# Patient Record
Sex: FEMALE | Race: OTHER
[De-identification: names, ages, dates, MRNs, and addresses within clinical notes are randomized per-mention and may not be internally consistent; named-entity substitution may affect disease eponyms.]

---

## 2017-06-11 ENCOUNTER — HOSPITAL ENCOUNTER (EMERGENCY)
Dept: HOSPITAL 54 - ER | Age: 52
Discharge: HOME | End: 2017-06-11
Payer: MEDICAID

## 2017-06-11 VITALS — SYSTOLIC BLOOD PRESSURE: 138 MMHG | DIASTOLIC BLOOD PRESSURE: 75 MMHG

## 2017-06-11 VITALS — HEIGHT: 64 IN | BODY MASS INDEX: 30.73 KG/M2 | WEIGHT: 180 LBS

## 2017-06-11 DIAGNOSIS — R10.32: Primary | ICD-10-CM

## 2017-06-11 DIAGNOSIS — F41.9: ICD-10-CM

## 2017-06-11 DIAGNOSIS — Z98.890: ICD-10-CM

## 2017-06-11 DIAGNOSIS — Z88.2: ICD-10-CM

## 2017-06-11 LAB
ALBUMIN SERPL BCP-MCNC: 3.5 G/DL (ref 3.4–5)
ALP SERPL-CCNC: 65 U/L (ref 46–116)
ALT SERPL W P-5'-P-CCNC: 21 U/L (ref 12–78)
AST SERPL W P-5'-P-CCNC: 16 U/L (ref 15–37)
BASOPHILS # BLD AUTO: 0.1 /CMM (ref 0–0.2)
BASOPHILS NFR BLD AUTO: 1.3 % (ref 0–2)
BILIRUB DIRECT SERPL-MCNC: 0.1 MG/DL (ref 0–0.2)
BILIRUB SERPL-MCNC: 0.5 MG/DL (ref 0.2–1)
BUN SERPL-MCNC: 15 MG/DL (ref 7–18)
CALCIUM SERPL-MCNC: 9.3 MG/DL (ref 8.5–10.1)
CHLORIDE SERPL-SCNC: 105 MMOL/L (ref 98–107)
CO2 SERPL-SCNC: 26 MMOL/L (ref 21–32)
CREAT SERPL-MCNC: 1 MG/DL (ref 0.6–1.3)
EOSINOPHIL # BLD AUTO: 0.1 /CMM (ref 0–0.7)
EOSINOPHIL NFR BLD AUTO: 1.5 % (ref 0–6)
EOSINOPHIL NFR BLD MANUAL: 2 % (ref 0–4)
GLUCOSE SERPL-MCNC: 106 MG/DL (ref 74–106)
HCT VFR BLD AUTO: 38 % (ref 33–45)
HGB BLD-MCNC: 12.4 G/DL (ref 11.5–14.8)
LYMPHOCYTES NFR BLD AUTO: 2.1 /CMM (ref 0.8–4.8)
LYMPHOCYTES NFR BLD AUTO: 22 % (ref 20–44)
LYMPHOCYTES NFR BLD MANUAL: 19 % (ref 16–48)
MCH RBC QN AUTO: 28 PG (ref 26–33)
MCHC RBC AUTO-ENTMCNC: 33 G/DL (ref 31–36)
MCV RBC AUTO: 85 FL (ref 82–100)
MONOCYTES NFR BLD AUTO: 0.5 /CMM (ref 0.1–1.3)
MONOCYTES NFR BLD AUTO: 5.2 % (ref 2–12)
MONOCYTES NFR BLD MANUAL: 4 % (ref 0–11)
NEUTROPHILS # BLD AUTO: 6.9 /CMM (ref 1.8–8.9)
NEUTROPHILS NFR BLD AUTO: 70 % (ref 43–81)
NEUTS SEG NFR BLD MANUAL: 75 % (ref 42–76)
PH UR STRIP: 5.5 [PH] (ref 5–8)
PLATELET # BLD AUTO: 255 /CMM (ref 150–450)
POTASSIUM SERPL-SCNC: 3.9 MMOL/L (ref 3.5–5.1)
PROT SERPL-MCNC: 7.8 G/DL (ref 6.4–8.2)
RBC # BLD AUTO: 4.51 MIL/UL (ref 4–5.2)
RBC #/AREA URNS HPF: (no result) /HPF (ref 0–2)
RDW COEFFICIENT OF VARIATION: 12.6 (ref 11.5–15)
SODIUM SERPL-SCNC: 141 MMOL/L (ref 136–145)
UROBILINOGEN UR STRIP-MCNC: 0.2 EU/DL
WBC #/AREA URNS HPF: (no result) /HPF (ref 0–3)
WBC NRBC COR # BLD AUTO: 9.7 K/UL (ref 4.3–11)

## 2017-06-11 PROCEDURE — Z7610: HCPCS

## 2017-06-11 PROCEDURE — A4606 OXYGEN PROBE USED W OXIMETER: HCPCS

## 2017-06-11 NOTE — NUR
Presents self to ed due to abdominal pain, aching, 6/10, radiating to back. 
Patient is aao4. Appears in no apparent distress, respiration even and 
unlabored. Skin is warm to touch and non diaphoretic. Afebrile. Vss. Gowned pt 
and placed on tele monitor

## 2018-08-25 ENCOUNTER — HOSPITAL ENCOUNTER (EMERGENCY)
Age: 53
Discharge: HOME | End: 2018-08-25

## 2018-08-25 ENCOUNTER — HOSPITAL ENCOUNTER (EMERGENCY)
Dept: HOSPITAL 91 - FTE | Age: 53
Discharge: HOME | End: 2018-08-25
Payer: COMMERCIAL

## 2018-08-25 DIAGNOSIS — K52.9: Primary | ICD-10-CM

## 2018-08-25 LAB
ADD MAN DIFF?: NO
ADD UMIC: YES
ALANINE AMINOTRANSFERASE: 36 IU/L (ref 13–69)
ALBUMIN/GLOBULIN RATIO: 1
ALBUMIN: 4.2 G/DL (ref 3.3–4.9)
ALKALINE PHOSPHATASE: 93 IU/L (ref 42–121)
ANION GAP: 14 (ref 8–16)
ASPARTATE AMINO TRANSFERASE: 24 IU/L (ref 15–46)
BASOPHIL #: 0 10^3/UL (ref 0–0.1)
BASOPHILS %: 0.5 % (ref 0–2)
BILIRUBIN,DIRECT: 0 MG/DL (ref 0–0.2)
BILIRUBIN,TOTAL: 0.6 MG/DL (ref 0.2–1.3)
BLOOD UREA NITROGEN: 10 MG/DL (ref 7–20)
CALCIUM: 10.1 MG/DL (ref 8.4–10.2)
CARBON DIOXIDE: 26 MMOL/L (ref 21–31)
CHLORIDE: 106 MMOL/L (ref 97–110)
CREATININE: 0.86 MG/DL (ref 0.44–1)
EOSINOPHILS #: 0.1 10^3/UL (ref 0–0.5)
EOSINOPHILS %: 1.1 % (ref 0–7)
GLOBULIN: 4.2 G/DL (ref 1.3–3.2)
GLUCOSE: 98 MG/DL (ref 70–220)
HEMATOCRIT: 41.2 % (ref 37–47)
HEMOGLOBIN: 13.2 G/DL (ref 12–16)
IMMATURE GRANS #M: 0.02 10^3/UL (ref 0–0.03)
IMMATURE GRANS % (M): 0.3 % (ref 0–0.43)
INR: 0.97
LIPASE: 77 U/L (ref 23–300)
LYMPHOCYTES #: 0.9 10^3/UL (ref 0.8–2.9)
LYMPHOCYTES %: 14.2 % (ref 15–51)
MEAN CORPUSCULAR HEMOGLOBIN: 27.6 PG (ref 29–33)
MEAN CORPUSCULAR HGB CONC: 32 G/DL (ref 32–37)
MEAN CORPUSCULAR VOLUME: 86.2 FL (ref 82–101)
MEAN PLATELET VOLUME: 10.7 FL (ref 7.4–10.4)
MONOCYTE #: 0.3 10^3/UL (ref 0.3–0.9)
MONOCYTES %: 5.2 % (ref 0–11)
NEUTROPHIL #: 4.8 10^3/UL (ref 1.6–7.5)
NEUTROPHILS %: 78.7 % (ref 39–77)
NUCLEATED RED BLOOD CELLS #: 0 10^3/UL (ref 0–0)
NUCLEATED RED BLOOD CELLS%: 0 /100WBC (ref 0–0)
PARTIAL THROMBOPLASTIN TIME: 32.2 SEC (ref 25–35)
PLATELET COUNT: 218 10^3/UL (ref 140–415)
POTASSIUM: 4.2 MMOL/L (ref 3.5–5.1)
PROTIME: 13 SEC (ref 11.9–14.9)
PT RATIO: 1
RED BLOOD COUNT: 4.78 10^6/UL (ref 4.2–5.4)
RED CELL DISTRIBUTION WIDTH: 13.6 % (ref 11.5–14.5)
SODIUM: 142 MMOL/L (ref 135–144)
TOTAL PROTEIN: 8.4 G/DL (ref 6.1–8.1)
UR ASCORBIC ACID: NEGATIVE MG/DL
UR BACTERIA: (no result) /HPF
UR BILIRUBIN (DIP): NEGATIVE MG/DL
UR BLOOD (DIP): (no result) MG/DL
UR CLARITY: (no result)
UR COLOR: YELLOW
UR GLUCOSE (DIP): NEGATIVE MG/DL
UR KETONES (DIP): NEGATIVE MG/DL
UR LEUKOCYTE ESTERASE (DIP): (no result) LEU/UL
UR NITRITE (DIP): NEGATIVE MG/DL
UR PH (DIP): 6 (ref 5–9)
UR RBC: 4 /HPF (ref 0–5)
UR SPECIFIC GRAVITY (DIP): 1.01 (ref 1–1.03)
UR SQUAMOUS EPITHELIAL CELL: (no result) /HPF
UR TOTAL PROTEIN (DIP): NEGATIVE MG/DL
UR UROBILINOGEN (DIP): NEGATIVE MG/DL
UR WBC: 3 /HPF (ref 0–5)
WHITE BLOOD COUNT: 6.1 10^3/UL (ref 4.8–10.8)

## 2018-08-25 PROCEDURE — 99283 EMERGENCY DEPT VISIT LOW MDM: CPT

## 2018-08-25 PROCEDURE — 87086 URINE CULTURE/COLONY COUNT: CPT

## 2018-08-25 PROCEDURE — 81001 URINALYSIS AUTO W/SCOPE: CPT

## 2018-08-25 PROCEDURE — 85610 PROTHROMBIN TIME: CPT

## 2018-08-25 PROCEDURE — 85025 COMPLETE CBC W/AUTO DIFF WBC: CPT

## 2018-08-25 PROCEDURE — 85730 THROMBOPLASTIN TIME PARTIAL: CPT

## 2018-08-25 PROCEDURE — 80053 COMPREHEN METABOLIC PANEL: CPT

## 2018-08-25 PROCEDURE — 81025 URINE PREGNANCY TEST: CPT

## 2018-08-25 PROCEDURE — 83690 ASSAY OF LIPASE: CPT

## 2018-08-25 RX ADMIN — ONDANSETRON 1 MG: 4 TABLET, ORALLY DISINTEGRATING ORAL at 13:13

## 2019-11-17 ENCOUNTER — HOSPITAL ENCOUNTER (EMERGENCY)
Dept: HOSPITAL 54 - ER | Age: 54
Discharge: HOME | End: 2019-11-17
Payer: MEDICAID

## 2019-11-17 VITALS — DIASTOLIC BLOOD PRESSURE: 72 MMHG | SYSTOLIC BLOOD PRESSURE: 122 MMHG

## 2019-11-17 VITALS — BODY MASS INDEX: 35.34 KG/M2 | HEIGHT: 60 IN | WEIGHT: 180 LBS

## 2019-11-17 DIAGNOSIS — G89.29: ICD-10-CM

## 2019-11-17 DIAGNOSIS — Z88.2: ICD-10-CM

## 2019-11-17 DIAGNOSIS — R51: ICD-10-CM

## 2019-11-17 DIAGNOSIS — F43.9: ICD-10-CM

## 2019-11-17 DIAGNOSIS — F41.9: ICD-10-CM

## 2019-11-17 DIAGNOSIS — I10: ICD-10-CM

## 2019-11-17 DIAGNOSIS — R00.2: Primary | ICD-10-CM

## 2019-11-17 DIAGNOSIS — Z79.899: ICD-10-CM

## 2019-11-17 LAB
BASOPHILS # BLD AUTO: 0.1 /CMM (ref 0–0.2)
BASOPHILS NFR BLD AUTO: 1.1 % (ref 0–2)
BUN SERPL-MCNC: 11 MG/DL (ref 7–18)
CALCIUM SERPL-MCNC: 10 MG/DL (ref 8.5–10.1)
CHLORIDE SERPL-SCNC: 102 MMOL/L (ref 98–107)
CO2 SERPL-SCNC: 28 MMOL/L (ref 21–32)
CREAT SERPL-MCNC: 0.9 MG/DL (ref 0.6–1.3)
EOSINOPHIL NFR BLD AUTO: 2.3 % (ref 0–6)
GLUCOSE SERPL-MCNC: 100 MG/DL (ref 74–106)
HCT VFR BLD AUTO: 39 % (ref 33–45)
HGB BLD-MCNC: 12.7 G/DL (ref 11.5–14.8)
LYMPHOCYTES NFR BLD AUTO: 2 /CMM (ref 0.8–4.8)
LYMPHOCYTES NFR BLD AUTO: 22.6 % (ref 20–44)
MCHC RBC AUTO-ENTMCNC: 33 G/DL (ref 31–36)
MCV RBC AUTO: 82 FL (ref 82–100)
MONOCYTES NFR BLD AUTO: 0.6 /CMM (ref 0.1–1.3)
MONOCYTES NFR BLD AUTO: 6.3 % (ref 2–12)
NEUTROPHILS # BLD AUTO: 6.1 /CMM (ref 1.8–8.9)
NEUTROPHILS NFR BLD AUTO: 67.7 % (ref 43–81)
PLATELET # BLD AUTO: 293 /CMM (ref 150–450)
POTASSIUM SERPL-SCNC: 4.2 MMOL/L (ref 3.5–5.1)
RBC # BLD AUTO: 4.7 MIL/UL (ref 4–5.2)
SODIUM SERPL-SCNC: 138 MMOL/L (ref 136–145)
WBC NRBC COR # BLD AUTO: 8.9 K/UL (ref 4.3–11)

## 2019-11-17 PROCEDURE — 96374 THER/PROPH/DIAG INJ IV PUSH: CPT

## 2019-11-17 PROCEDURE — 99284 EMERGENCY DEPT VISIT MOD MDM: CPT

## 2019-11-17 PROCEDURE — 80048 BASIC METABOLIC PNL TOTAL CA: CPT

## 2019-11-17 PROCEDURE — 84484 ASSAY OF TROPONIN QUANT: CPT

## 2019-11-17 PROCEDURE — 85025 COMPLETE CBC W/AUTO DIFF WBC: CPT

## 2019-11-17 PROCEDURE — 36415 COLL VENOUS BLD VENIPUNCTURE: CPT

## 2019-11-17 PROCEDURE — 71045 X-RAY EXAM CHEST 1 VIEW: CPT

## 2019-11-17 PROCEDURE — 93005 ELECTROCARDIOGRAM TRACING: CPT

## 2019-11-17 NOTE — NUR
PATIENT REFUSED NORCO 5-325MG PO AND ATIVAN 2MG PO BECAUSE SHE WILL DRIVE GOING 
HOME. WASTED NORCO 5-325MG AND ATIVAN 2MG PO WITH KRISTEN COLINDRES RN.

## 2019-11-17 NOTE — NUR
CAME IN FOR CHEST PRESSURE LIKE PAIN ON AND OFF x 1WEEK AND CONSTANT HEADACHE x 
1 WEEK, TO ER BED 9, HOOKED TO CARDIAC MONITOR, CHANGED TO HOSP GOWN, PROVIDED 
W WARM BLANKET, AWAITING MD FREY.

## 2020-07-27 ENCOUNTER — HOSPITAL ENCOUNTER (EMERGENCY)
Dept: HOSPITAL 54 - ER | Age: 55
Discharge: LEFT BEFORE DISCHARGE | End: 2020-07-27
Payer: MEDICAID

## 2020-07-27 DIAGNOSIS — Z53.21: Primary | ICD-10-CM

## 2021-03-12 ENCOUNTER — HOSPITAL ENCOUNTER (EMERGENCY)
Dept: HOSPITAL 54 - ER | Age: 56
Discharge: HOME | End: 2021-03-12
Payer: MEDICAID

## 2021-03-12 VITALS — WEIGHT: 227 LBS | BODY MASS INDEX: 40.22 KG/M2 | HEIGHT: 63 IN

## 2021-03-12 VITALS — SYSTOLIC BLOOD PRESSURE: 141 MMHG | DIASTOLIC BLOOD PRESSURE: 88 MMHG

## 2021-03-12 DIAGNOSIS — F41.9: ICD-10-CM

## 2021-03-12 DIAGNOSIS — R07.89: Primary | ICD-10-CM

## 2021-03-12 DIAGNOSIS — I10: ICD-10-CM

## 2021-03-12 DIAGNOSIS — Z79.899: ICD-10-CM

## 2021-03-12 DIAGNOSIS — Z88.2: ICD-10-CM

## 2021-03-12 LAB
BASOPHILS # BLD AUTO: 0.1 /CMM (ref 0–0.2)
BASOPHILS NFR BLD AUTO: 0.8 % (ref 0–2)
BUN SERPL-MCNC: 9 MG/DL (ref 7–18)
CALCIUM SERPL-MCNC: 10 MG/DL (ref 8.5–10.1)
CHLORIDE SERPL-SCNC: 103 MMOL/L (ref 98–107)
CO2 SERPL-SCNC: 30 MMOL/L (ref 21–32)
CREAT SERPL-MCNC: 0.9 MG/DL (ref 0.6–1.3)
EOSINOPHIL NFR BLD AUTO: 2.8 % (ref 0–6)
GLUCOSE SERPL-MCNC: 111 MG/DL (ref 74–106)
HCT VFR BLD AUTO: 38 % (ref 33–45)
HGB BLD-MCNC: 12.5 G/DL (ref 11.5–14.8)
LYMPHOCYTES NFR BLD AUTO: 1.8 /CMM (ref 0.8–4.8)
LYMPHOCYTES NFR BLD AUTO: 26.7 % (ref 20–44)
MCHC RBC AUTO-ENTMCNC: 33 G/DL (ref 31–36)
MCV RBC AUTO: 82 FL (ref 82–100)
MONOCYTES NFR BLD AUTO: 0.4 /CMM (ref 0.1–1.3)
MONOCYTES NFR BLD AUTO: 6.1 % (ref 2–12)
NEUTROPHILS # BLD AUTO: 4.2 /CMM (ref 1.8–8.9)
NEUTROPHILS NFR BLD AUTO: 63.6 % (ref 43–81)
PLATELET # BLD AUTO: 265 /CMM (ref 150–450)
POTASSIUM SERPL-SCNC: 3.8 MMOL/L (ref 3.5–5.1)
RBC # BLD AUTO: 4.61 MIL/UL (ref 4–5.2)
SODIUM SERPL-SCNC: 140 MMOL/L (ref 136–145)
WBC NRBC COR # BLD AUTO: 6.6 K/UL (ref 4.3–11)

## 2021-03-12 NOTE — NUR
The patient resting. Denies pain at this time. In room air and denies SOB. 
Respiration regular and unlabored. Will continue to monitor.

## 2021-03-12 NOTE — NUR
Patient alert and oriented x4. Denies pain. Respiration regular and unlabored. 
Denies SOB. Patient discharged to home in stable condition. Written and verbal 
after care instructions given. Patient verbalizes understanding of instruction. 
Patient left ER in stable condition.

## 2021-03-12 NOTE — NUR
"Been Having chest pain on/off x3days. today Feel weak/lightheaded/dizzy. PT 
AAOX4, VSS. RR EVEN & UNLABORED. DENIES SOB, N/V AT THIS TIME. PT SEEN & EVAL'D 
BY DR. PATRICK. PLACED ON CARDIAC MONITOR, SR. WILL CONT TO MONITOR.

## 2022-06-25 ENCOUNTER — HOSPITAL ENCOUNTER (EMERGENCY)
Dept: HOSPITAL 54 - ER | Age: 57
Discharge: HOME | End: 2022-06-25
Payer: MEDICAID

## 2022-06-25 VITALS — DIASTOLIC BLOOD PRESSURE: 80 MMHG | SYSTOLIC BLOOD PRESSURE: 128 MMHG

## 2022-06-25 VITALS — BODY MASS INDEX: 28.93 KG/M2 | WEIGHT: 180 LBS | HEIGHT: 66 IN

## 2022-06-25 DIAGNOSIS — N39.0: ICD-10-CM

## 2022-06-25 DIAGNOSIS — Z79.899: ICD-10-CM

## 2022-06-25 DIAGNOSIS — F41.9: ICD-10-CM

## 2022-06-25 DIAGNOSIS — R10.30: Primary | ICD-10-CM

## 2022-06-25 DIAGNOSIS — Z88.2: ICD-10-CM

## 2022-06-25 LAB
ALBUMIN SERPL BCP-MCNC: 3.8 G/DL (ref 3.4–5)
ALP SERPL-CCNC: 88 U/L (ref 46–116)
ALT SERPL W P-5'-P-CCNC: 33 U/L (ref 12–78)
AST SERPL W P-5'-P-CCNC: 17 U/L (ref 15–37)
BASOPHILS # BLD AUTO: 0.1 K/UL (ref 0–0.2)
BASOPHILS NFR BLD AUTO: 0.6 % (ref 0–2)
BILIRUB DIRECT SERPL-MCNC: 0.1 MG/DL (ref 0–0.2)
BILIRUB SERPL-MCNC: 0.6 MG/DL (ref 0.2–1)
BILIRUB UR QL STRIP: NEGATIVE
BUN SERPL-MCNC: 9 MG/DL (ref 7–18)
CALCIUM SERPL-MCNC: 10.1 MG/DL (ref 8.5–10.1)
CHLORIDE SERPL-SCNC: 102 MMOL/L (ref 98–107)
CO2 SERPL-SCNC: 34 MMOL/L (ref 21–32)
COLOR UR: YELLOW
CREAT SERPL-MCNC: 0.9 MG/DL (ref 0.6–1.3)
DEPRECATED SQUAMOUS URNS QL MICRO: (no result) /HPF
EOSINOPHIL NFR BLD AUTO: 1.5 % (ref 0–6)
GLUCOSE SERPL-MCNC: 100 MG/DL (ref 74–106)
GLUCOSE UR STRIP-MCNC: NEGATIVE MG/DL
HCT VFR BLD AUTO: 40 % (ref 33–45)
HGB BLD-MCNC: 13.2 G/DL (ref 11.5–14.8)
LEUKOCYTE ESTERASE UR QL STRIP: (no result)
LIPASE SERPL-CCNC: 124 U/L (ref 73–393)
LYMPHOCYTES NFR BLD AUTO: 2 K/UL (ref 0.8–4.8)
LYMPHOCYTES NFR BLD AUTO: 23.2 % (ref 20–44)
MCHC RBC AUTO-ENTMCNC: 33 G/DL (ref 31–36)
MCV RBC AUTO: 79 FL (ref 82–100)
MONOCYTES NFR BLD AUTO: 0.6 K/UL (ref 0.1–1.3)
MONOCYTES NFR BLD AUTO: 7 % (ref 2–12)
NEUTROPHILS # BLD AUTO: 5.9 K/UL (ref 1.8–8.9)
NEUTROPHILS NFR BLD AUTO: 67.7 % (ref 43–81)
NITRITE UR QL STRIP: NEGATIVE
PH UR STRIP: 8 [PH] (ref 5–8)
PLATELET # BLD AUTO: 282 K/UL (ref 150–450)
POTASSIUM SERPL-SCNC: 3.7 MMOL/L (ref 3.5–5.1)
PROT SERPL-MCNC: 8.6 G/DL (ref 6.4–8.2)
PROT UR QL STRIP: NEGATIVE MG/DL
RBC # BLD AUTO: 5.08 MIL/UL (ref 4–5.2)
RBC #/AREA URNS HPF: (no result) /HPF (ref 0–2)
SODIUM SERPL-SCNC: 141 MMOL/L (ref 136–145)
UROBILINOGEN UR STRIP-MCNC: 0.2 EU/DL
WBC #/AREA URNS HPF: (no result) /HPF
WBC #/AREA URNS HPF: (no result) /HPF (ref 0–3)
WBC NRBC COR # BLD AUTO: 8.7 K/UL (ref 4.3–11)

## 2022-06-25 PROCEDURE — 83690 ASSAY OF LIPASE: CPT

## 2022-06-25 PROCEDURE — 80048 BASIC METABOLIC PNL TOTAL CA: CPT

## 2022-06-25 PROCEDURE — 99284 EMERGENCY DEPT VISIT MOD MDM: CPT

## 2022-06-25 PROCEDURE — 85025 COMPLETE CBC W/AUTO DIFF WBC: CPT

## 2022-06-25 PROCEDURE — 74176 CT ABD & PELVIS W/O CONTRAST: CPT

## 2022-06-25 PROCEDURE — 96372 THER/PROPH/DIAG INJ SC/IM: CPT

## 2022-06-25 PROCEDURE — 81001 URINALYSIS AUTO W/SCOPE: CPT

## 2022-06-25 PROCEDURE — 80076 HEPATIC FUNCTION PANEL: CPT

## 2022-06-25 PROCEDURE — 87086 URINE CULTURE/COLONY COUNT: CPT

## 2022-06-25 PROCEDURE — 36415 COLL VENOUS BLD VENIPUNCTURE: CPT

## 2022-06-25 NOTE — NUR
TO ER BED 11, C/O ABDOMINAL PAIN/BACK PAIN RADIATING TO BOTH LEGS X 2 DAYS, 
AAOX3, BREATHING EVEN AND NON LABORED, CONNECTED TO MONITOR, AWAITING MD ORDERS